# Patient Record
Sex: MALE | Race: WHITE | NOT HISPANIC OR LATINO
[De-identification: names, ages, dates, MRNs, and addresses within clinical notes are randomized per-mention and may not be internally consistent; named-entity substitution may affect disease eponyms.]

---

## 2020-05-29 ENCOUNTER — TRANSCRIPTION ENCOUNTER (OUTPATIENT)
Age: 55
End: 2020-05-29

## 2020-06-02 ENCOUNTER — TRANSCRIPTION ENCOUNTER (OUTPATIENT)
Age: 55
End: 2020-06-02

## 2020-07-27 ENCOUNTER — TRANSCRIPTION ENCOUNTER (OUTPATIENT)
Age: 55
End: 2020-07-27

## 2021-05-21 ENCOUNTER — TRANSCRIPTION ENCOUNTER (OUTPATIENT)
Age: 56
End: 2021-05-21

## 2021-05-23 ENCOUNTER — EMERGENCY (EMERGENCY)
Facility: HOSPITAL | Age: 56
LOS: 1 days | End: 2021-05-23
Admitting: EMERGENCY MEDICINE
Payer: COMMERCIAL

## 2021-05-23 PROCEDURE — 99284 EMERGENCY DEPT VISIT MOD MDM: CPT

## 2021-06-01 PROBLEM — Z00.00 ENCOUNTER FOR PREVENTIVE HEALTH EXAMINATION: Status: ACTIVE | Noted: 2021-06-01

## 2021-06-03 ENCOUNTER — APPOINTMENT (OUTPATIENT)
Dept: NEUROSURGERY | Facility: CLINIC | Age: 56
End: 2021-06-03
Payer: COMMERCIAL

## 2021-06-03 VITALS — WEIGHT: 215 LBS | BODY MASS INDEX: 30.78 KG/M2 | TEMPERATURE: 98 F | HEIGHT: 70 IN

## 2021-06-03 DIAGNOSIS — M79.2 NEURALGIA AND NEURITIS, UNSPECIFIED: ICD-10-CM

## 2021-06-03 DIAGNOSIS — M54.2 CERVICALGIA: ICD-10-CM

## 2021-06-03 PROCEDURE — 99203 OFFICE O/P NEW LOW 30 MIN: CPT

## 2021-06-03 NOTE — HISTORY OF PRESENT ILLNESS
[FreeTextEntry1] : Neck and left arm pain [de-identified] : This is a 55-year-old male presents for new patient visit consultation in regards to longstanding chronic neck and left arm pain.  In the past has tried some physical therapy and anti-inflammatories without relief.  Describes the pain in the center of his neck with radiation to his left shoulder as well as left bicep and left forearm.  He has associated numbness tingling in that area.  Feels the arm becomes weak at points.  Denies any balance bladder issues.  Pain is positional.  Denies any relieving factors\par \par Has not had any recent physical therapy \par Has taken tizanidine Percocet and ibuprofen as well as other muscle relaxers without relief

## 2021-06-03 NOTE — ASSESSMENT
[FreeTextEntry1] : 55-year-old male with neck and left arm pain longstanding not relieved with past physical therapy and anti-inflammatories.  Today he had no new imaging.  He had a report from 2015 when MRI of cervical spine which showed degenerative changes as well as herniated disks.  This point I would like to obtain a new MRI of the cervical spine without contrast to rule out herniated disc.  The meantime I will start him on a course of Celebrex as well as start physical therapy.  I have answered all the patient's questions and he was satisfied with the visit we will see him back in follow-up once MRI is completed

## 2021-06-03 NOTE — PHYSICAL EXAM
[General Appearance - Alert] : alert [General Appearance - Well Nourished] : well nourished [Intact] : all reflexes within normal limits bilaterally

## 2021-06-14 ENCOUNTER — APPOINTMENT (OUTPATIENT)
Dept: NEUROSURGERY | Facility: CLINIC | Age: 56
End: 2021-06-14

## 2021-08-02 ENCOUNTER — APPOINTMENT (OUTPATIENT)
Dept: CARDIOLOGY | Facility: CLINIC | Age: 56
End: 2021-08-02
Payer: COMMERCIAL

## 2021-08-02 ENCOUNTER — NON-APPOINTMENT (OUTPATIENT)
Age: 56
End: 2021-08-02

## 2021-08-02 VITALS
BODY MASS INDEX: 30.78 KG/M2 | WEIGHT: 215 LBS | HEIGHT: 70 IN | SYSTOLIC BLOOD PRESSURE: 112 MMHG | OXYGEN SATURATION: 98 % | TEMPERATURE: 97.7 F | DIASTOLIC BLOOD PRESSURE: 70 MMHG | HEART RATE: 89 BPM

## 2021-08-02 DIAGNOSIS — E03.9 HYPOTHYROIDISM, UNSPECIFIED: ICD-10-CM

## 2021-08-02 DIAGNOSIS — Z82.3 FAMILY HISTORY OF STROKE: ICD-10-CM

## 2021-08-02 DIAGNOSIS — Z78.9 OTHER SPECIFIED HEALTH STATUS: ICD-10-CM

## 2021-08-02 DIAGNOSIS — Z82.49 FAMILY HISTORY OF ISCHEMIC HEART DISEASE AND OTHER DISEASES OF THE CIRCULATORY SYSTEM: ICD-10-CM

## 2021-08-02 DIAGNOSIS — Z72.3 LACK OF PHYSICAL EXERCISE: ICD-10-CM

## 2021-08-02 DIAGNOSIS — Z83.438 FAMILY HISTORY OF OTHER DISORDER OF LIPOPROTEIN METABOLISM AND OTHER LIPIDEMIA: ICD-10-CM

## 2021-08-02 PROCEDURE — 93000 ELECTROCARDIOGRAM COMPLETE: CPT

## 2021-08-02 PROCEDURE — 99204 OFFICE O/P NEW MOD 45 MIN: CPT

## 2021-08-02 RX ORDER — ATORVASTATIN CALCIUM 20 MG/1
20 TABLET, FILM COATED ORAL
Qty: 90 | Refills: 1 | Status: ACTIVE | COMMUNITY
Start: 2021-08-02

## 2021-08-02 RX ORDER — LEVOTHYROXINE SODIUM 0.17 MG/1
175 TABLET ORAL DAILY
Refills: 0 | Status: ACTIVE | COMMUNITY

## 2021-08-02 RX ORDER — CELECOXIB 200 MG/1
200 CAPSULE ORAL DAILY
Qty: 30 | Refills: 1 | Status: DISCONTINUED | COMMUNITY
Start: 2021-06-03 | End: 2021-08-02

## 2021-08-02 RX ORDER — IBUPROFEN 200 MG/1
CAPSULE, LIQUID FILLED ORAL
Refills: 0 | Status: DISCONTINUED | COMMUNITY
End: 2021-08-02

## 2021-08-02 NOTE — DISCUSSION/SUMMARY
[FreeTextEntry1] : Patient with diabetes requiring insulin pump, overweight, hypercholesterolemia, etc. not have any symptoms of diabetic neuropathy or other endorgan damage from diabetes.\par \par No previous major cardiovascular events\par \par Evaluate dyspnea on exertion and occasional epigastric discomfort with echo and ETT.  UnEqual carotid upstrokes: Also check carotid ultrasound\par \par Target LDL less than 100, close to 70 if possible.  The patient claims that he has not been compliant and regular with his Lipitor intake.  He will do so now.  He will recheck his lipid profile with primary care office in few months.  If the LDL remains above 100, increase Lipitor to 40 mg daily.  Dietary changes were also discussed.\par \par He should start low-dose aspirin on daily basis\par \par Follow-up after echo and ETT and carotid ultrasound\par \par Thank you for this referral and allowing me to participate in the care of this patient.  If I can be of any further help or  if you have any questions, please do not hesitate to contact me\par \par \par Sincerely,\par \par Sreekanth Chambers MD, FACC, JAKE

## 2021-08-02 NOTE — REASON FOR VISIT
[FreeTextEntry1] : Leonard is a 55-year-old male with history of diabetes requiring insulin (now on insulin pump), hypercholesterolemia, overweight, hypothyroidism.\par \par He Is fairly active and does not have exertional chest pain although he does have shortness of breath on exertion without PND or orthopnea.  Occasional epigastric discomfort is difficult to differentiate from epigastric chest pain.  No past history of TIA, syncope, palpitations or pedal edema.

## 2021-08-02 NOTE — ASSESSMENT
[FreeTextEntry1] : Reviewed today:\par -EKG 8/2/2021: Sinus rhythm, unremarkable\par -Labs February 2021: A1c 7.6, creatinine 0.9, .  Urine microalbumin 39.

## 2021-08-02 NOTE — PHYSICAL EXAM
[Well Developed] : well developed [Well Nourished] : well nourished [No Acute Distress] : no acute distress [Normal Conjunctiva] : normal conjunctiva [Normal Venous Pressure] : normal venous pressure [No Carotid Bruit] : no carotid bruit [Normal S1, S2] : normal S1, S2 [No Murmur] : no murmur [No Rub] : no rub [No Gallop] : no gallop [Clear Lung Fields] : clear lung fields [Good Air Entry] : good air entry [No Respiratory Distress] : no respiratory distress  [Soft] : abdomen soft [Non Tender] : non-tender [No Masses/organomegaly] : no masses/organomegaly [Normal Bowel Sounds] : normal bowel sounds [Normal Gait] : normal gait [No Edema] : no edema [No Cyanosis] : no cyanosis [No Clubbing] : no clubbing [No Varicosities] : no varicosities [No Rash] : no rash [No Skin Lesions] : no skin lesions [Moves all extremities] : moves all extremities [No Focal Deficits] : no focal deficits [Normal Speech] : normal speech [Alert and Oriented] : alert and oriented [Normal memory] : normal memory [de-identified] : Unequal carotid upstrokes by palpation

## 2021-09-08 ENCOUNTER — TRANSCRIPTION ENCOUNTER (OUTPATIENT)
Age: 56
End: 2021-09-08

## 2021-09-10 ENCOUNTER — APPOINTMENT (OUTPATIENT)
Dept: CARDIOLOGY | Facility: CLINIC | Age: 56
End: 2021-09-10
Payer: COMMERCIAL

## 2021-09-10 PROCEDURE — 93880 EXTRACRANIAL BILAT STUDY: CPT

## 2021-09-10 PROCEDURE — 93306 TTE W/DOPPLER COMPLETE: CPT

## 2021-09-20 ENCOUNTER — APPOINTMENT (OUTPATIENT)
Dept: CARDIOLOGY | Facility: CLINIC | Age: 56
End: 2021-09-20
Payer: COMMERCIAL

## 2021-09-20 PROCEDURE — 93015 CV STRESS TEST SUPVJ I&R: CPT

## 2021-09-23 ENCOUNTER — APPOINTMENT (OUTPATIENT)
Dept: CARDIOLOGY | Facility: CLINIC | Age: 56
End: 2021-09-23
Payer: COMMERCIAL

## 2021-09-23 VITALS
TEMPERATURE: 97.6 F | DIASTOLIC BLOOD PRESSURE: 60 MMHG | HEART RATE: 87 BPM | WEIGHT: 219 LBS | OXYGEN SATURATION: 98 % | SYSTOLIC BLOOD PRESSURE: 122 MMHG | BODY MASS INDEX: 31.35 KG/M2 | HEIGHT: 70 IN

## 2021-09-23 DIAGNOSIS — E78.5 HYPERLIPIDEMIA, UNSPECIFIED: ICD-10-CM

## 2021-09-23 DIAGNOSIS — E11.9 TYPE 2 DIABETES MELLITUS W/OUT COMPLICATIONS: ICD-10-CM

## 2021-09-23 DIAGNOSIS — R06.02 SHORTNESS OF BREATH: ICD-10-CM

## 2021-09-23 PROCEDURE — 99214 OFFICE O/P EST MOD 30 MIN: CPT

## 2021-09-23 NOTE — REVIEW OF SYSTEMS
[Feeling Fatigued] : feeling fatigued [Dyspnea on exertion] : dyspnea during exertion [Chest Discomfort] : chest discomfort [Myalgia] : myalgia [Negative] : Psychiatric

## 2021-09-27 NOTE — REASON FOR VISIT
[FreeTextEntry1] : LEONARD WING  is a 55 year M  who presents today Sep 23, 2021 to review recent cardiovascular testing. Unfortunately he continues to have dyspnea on exertion, atypical chest pain and fatigue. DM has been uncontrolled - even with using insulin pump. There has been weight gain due to dietary indiscretion. \par \par Leonard is a 55-year-old male with history of diabetes requiring insulin (now on insulin pump), hypercholesterolemia, overweight, hypothyroidism.\par \par There is no prior history of myocardial infarction, coronary revascularization, history of ischemic heart disease, or symptomatic congestive heart failure. There is no history of symptomatic arrhythmias including atrial fibrillation. \par \par TESTING\par \par Echo 9/10/2021 EF 60%, mild MR\par \par Carotid US 9/10/2021 normal carotid arteries\par \par ETT 9/20/2021 9 min of Deejay protocol, no evidence of ischemia \par \par -EKG 8/2/2021: Sinus rhythm, unremarkable\par \par -Labs February 2021: A1c 7.6, creatinine 0.9, .  Urine microalbumin 39.

## 2021-09-27 NOTE — PHYSICAL EXAM
[Well Developed] : well developed [Well Nourished] : well nourished [No Acute Distress] : no acute distress [Normal Conjunctiva] : normal conjunctiva [Normal Venous Pressure] : normal venous pressure [No Carotid Bruit] : no carotid bruit [Normal S1, S2] : normal S1, S2 [No Murmur] : no murmur [No Rub] : no rub [No Gallop] : no gallop [Clear Lung Fields] : clear lung fields [Good Air Entry] : good air entry [No Respiratory Distress] : no respiratory distress  [Soft] : abdomen soft [Non Tender] : non-tender [No Masses/organomegaly] : no masses/organomegaly [Normal Bowel Sounds] : normal bowel sounds [No Edema] : no edema [Normal Gait] : normal gait [No Cyanosis] : no cyanosis [No Clubbing] : no clubbing [No Varicosities] : no varicosities [No Rash] : no rash [No Skin Lesions] : no skin lesions [Moves all extremities] : moves all extremities [No Focal Deficits] : no focal deficits [Normal Speech] : normal speech [Alert and Oriented] : alert and oriented [Normal memory] : normal memory [de-identified] : Unequal carotid upstrokes by palpation

## 2021-09-27 NOTE — DISCUSSION/SUMMARY
[FreeTextEntry1] : HA WING  is a 55 year M  who presents today Sep 23, 2021 with the above history and the following active issues. \par \par Patient with diabetes requiring insulin pump. Recommend strict diabetic diet. \par Lifestyle and risk factor modification. \par Follow-up with endocrine. \par \par Hyperlipidemia, target LDL less than 100, close to 70 if possible.  Recently restarted on Lipitor. Follow-up labs are scheduled next week.  If the LDL remains above 100, increase Lipitor to 40 mg daily.  Dietary changes were also discussed.\par \par Weight loss recommended. \par \par Symptoms of fatigue, atypical chest pain, dyspnea on exertion. ETT, echo and carotid US reviewed. Given persistent symptoms and risk factors for CAD - family history of CAD, HLD, uncontrolled DM, male and age. Recommend further evaluation with CTA coronary arteries. Limitations of non-invasive testing reviewed. \par I have asked the patient to call when testing is complete so we can review over the phone. \par \par At last visit it was recommended start low-dose aspirin on daily basis. This was not started and I provided samples today. \par Bleeding precautions reviewed.\par \par Red flag symptoms which would warrant sooner emergent evaluation reviewed with the patient. \par Questions and concerns were addressed and answered. \par \par Sincerely,\par \par Nina Penaloza PA-C\par Patients history, testing and plan reviewed with supervising MD: Dr. Alvina Hutchins \par

## 2021-10-10 ENCOUNTER — APPOINTMENT (OUTPATIENT)
Dept: DISASTER EMERGENCY | Facility: CLINIC | Age: 56
End: 2021-10-10

## 2021-10-10 DIAGNOSIS — Z01.818 ENCOUNTER FOR OTHER PREPROCEDURAL EXAMINATION: ICD-10-CM

## 2021-10-11 LAB — SARS-COV-2 N GENE NPH QL NAA+PROBE: NOT DETECTED

## 2021-10-13 ENCOUNTER — OUTPATIENT (OUTPATIENT)
Dept: OUTPATIENT SERVICES | Facility: HOSPITAL | Age: 56
LOS: 1 days | End: 2021-10-13

## 2021-11-16 ENCOUNTER — NON-APPOINTMENT (OUTPATIENT)
Age: 56
End: 2021-11-16

## 2023-04-23 ENCOUNTER — NON-APPOINTMENT (OUTPATIENT)
Age: 58
End: 2023-04-23

## 2024-02-29 ENCOUNTER — OFFICE (OUTPATIENT)
Dept: URBAN - METROPOLITAN AREA CLINIC 8 | Facility: CLINIC | Age: 59
Setting detail: OPHTHALMOLOGY
End: 2024-02-29
Payer: COMMERCIAL

## 2024-02-29 DIAGNOSIS — H35.362: ICD-10-CM

## 2024-02-29 DIAGNOSIS — H52.4: ICD-10-CM

## 2024-02-29 DIAGNOSIS — H25.13: ICD-10-CM

## 2024-02-29 DIAGNOSIS — E10.9: ICD-10-CM

## 2024-02-29 PROCEDURE — 92015 DETERMINE REFRACTIVE STATE: CPT | Performed by: OPHTHALMOLOGY

## 2024-02-29 PROCEDURE — 92250 FUNDUS PHOTOGRAPHY W/I&R: CPT | Performed by: OPHTHALMOLOGY

## 2024-02-29 PROCEDURE — 92014 COMPRE OPH EXAM EST PT 1/>: CPT | Performed by: OPHTHALMOLOGY

## 2024-02-29 ASSESSMENT — SPHEQUIV_DERIVED
OS_SPHEQUIV: -3
OS_SPHEQUIV: -2.625
OD_SPHEQUIV: -3.125
OD_SPHEQUIV: -3.5

## 2024-02-29 ASSESSMENT — REFRACTION_MANIFEST
OD_AXIS: 090
OD_VA1: 20/20
OD_SPHERE: -2.50
OS_CYLINDER: -1.00
OD_CYLINDER: -1.25
OD_AXIS: 090
OU_VA: 20/20-
OS_SPHERE: -2.00
OU_VA: 20/20
OD_VA2: 20/20(J1+)
OS_AXIS: 095
OS_CYLINDER: -1.25
OS_AXIS: 095
OS_VA2: 20/20(J1+)
OD_VA1: 20/20
OD_SPHERE: -3.00
OD_ADD: +2.25
OD_CYLINDER: -1.00
OS_SPHERE: -2.50
OD_ADD: +2.50
OS_VA1: 20/20-
OS_VA1: 20/20
OS_ADD: +2.50
OS_ADD: +2.25

## 2024-02-29 ASSESSMENT — REFRACTION_CURRENTRX
OD_AXIS: 090
OD_VPRISM_DIRECTION: PROGS
OS_AXIS: 083
OD_OVR_VA: 20/
OS_SPHERE: -3.00
OS_ADD: +2.25
OD_ADD: +2.00
OS_VPRISM_DIRECTION: PROGS
OD_CYLINDER: -1.00
OS_OVR_VA: 20/
OD_SPHERE: -2.75
OS_CYLINDER: -0.75

## 2024-02-29 ASSESSMENT — CONFRONTATIONAL VISUAL FIELD TEST (CVF)
OD_FINDINGS: FULL
OS_FINDINGS: FULL

## 2025-06-11 ENCOUNTER — NON-APPOINTMENT (OUTPATIENT)
Age: 60
End: 2025-06-11

## 2025-07-17 ENCOUNTER — OFFICE (OUTPATIENT)
Dept: URBAN - METROPOLITAN AREA CLINIC 38 | Facility: CLINIC | Age: 60
Setting detail: OPHTHALMOLOGY
End: 2025-07-17
Payer: MEDICAID

## 2025-07-17 DIAGNOSIS — H25.13: ICD-10-CM

## 2025-07-17 DIAGNOSIS — H35.362: ICD-10-CM

## 2025-07-17 DIAGNOSIS — E10.9: ICD-10-CM

## 2025-07-17 DIAGNOSIS — H52.4: ICD-10-CM

## 2025-07-17 PROCEDURE — 92014 COMPRE OPH EXAM EST PT 1/>: CPT | Performed by: OPHTHALMOLOGY

## 2025-07-17 PROCEDURE — 92134 CPTRZ OPH DX IMG PST SGM RTA: CPT | Performed by: OPHTHALMOLOGY

## 2025-07-17 PROCEDURE — 92015 DETERMINE REFRACTIVE STATE: CPT | Performed by: OPHTHALMOLOGY

## 2025-07-17 ASSESSMENT — REFRACTION_MANIFEST
OS_ADD: +2.50
OD_CYLINDER: -1.25
OD_ADD: +2.50
OS_CYLINDER: -1.00
OS_CYLINDER: -1.00
OD_VA1: 20/20
OU_VA: 20/20
OD_AXIS: 090
OS_SPHERE: -2.75
OD_SPHERE: -2.75
OS_SPHERE: -3.00
OS_AXIS: 090
OD_VA2: 20/20(J1+)
OD_SPHERE: -3.00
OS_VA2: 20/20(J1+)
OS_VA1: 20/20
OU_VA: 20/20
OS_VA2: 20/20(J1+)
OD_CYLINDER: -1.00
OD_AXIS: 090
OS_VA1: 20/20
OS_ADD: +2.25
OS_AXIS: 090
OD_VA1: 20/20
OD_VA2: 20/20(J1+)
OD_ADD: +2.25

## 2025-07-17 ASSESSMENT — REFRACTION_CURRENTRX
OD_OVR_VA: 20/
OS_CYLINDER: -0.75
OS_SPHERE: -2.75
OS_OVR_VA: 20/
OD_ADD: +2.25
OS_VPRISM_DIRECTION: PROGS
OS_ADD: +2.25
OD_SPHERE: -2.75
OS_AXIS: 090
OD_VPRISM_DIRECTION: PROGS
OD_CYLINDER: -1.00
OD_AXIS: 093

## 2025-07-17 ASSESSMENT — REFRACTION_AUTOREFRACTION
OD_SPHERE: -1.75
OD_AXIS: 092
OD_CYLINDER: -1.75

## 2025-07-17 ASSESSMENT — TONOMETRY
OS_IOP_MMHG: 17
OD_IOP_MMHG: 17

## 2025-07-17 ASSESSMENT — VISUAL ACUITY
OS_BCVA: 20/20
OD_BCVA: 20/20

## 2025-07-17 ASSESSMENT — CONFRONTATIONAL VISUAL FIELD TEST (CVF)
OS_FINDINGS: FULL
OD_FINDINGS: FULL

## 2025-07-17 ASSESSMENT — KERATOMETRY
OD_AXISANGLE_DEGREES: 005
OS_K2POWER_DIOPTERS: 42.25
OS_K1POWER_DIOPTERS: 41.75
METHOD_AUTO_MANUAL: AUTO
OD_K1POWER_DIOPTERS: 41.50
OD_K2POWER_DIOPTERS: 42.50
OS_AXISANGLE_DEGREES: 176